# Patient Record
Sex: MALE
[De-identification: names, ages, dates, MRNs, and addresses within clinical notes are randomized per-mention and may not be internally consistent; named-entity substitution may affect disease eponyms.]

---
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
## 2024-03-31 NOTE — ED PROVIDER NOTE - TEMPLATE
This was a shared visit with the CLAIRE. I reviewed and verified the documentation. Psych/Behavioral

## 2024-11-13 NOTE — ED BEHAVIORAL HEALTH ASSESSMENT NOTE - OTHER
He is being bullied.